# Patient Record
(demographics unavailable — no encounter records)

---

## 2017-12-19 NOTE — ERD
ER Documentation


Chief Complaint


Chief Complaint


HA on l side of head





HPI


43-year-old female sent in emergency department for complaints of a bump rash 

on the left scalp area and pain that started this morning, the patient 

describes the pain as sharp pain, 10/10 scale, as was upon touching the area.  

Patient does not have any other rash in other parts of the body.  Patient did 

not have any head injury.  Patient without any nausea or vomiting.  Patient 

does not have any blurry vision.  Patient did not take any medications to help 

with symptoms.  Patient denies any fever or chills





ROS


All systems reviewed and are negative except as per history of present illness.





Medications


Home Meds


Reported Medications


[none] Unknown Strength  No Conflict Check


   12/19/17





Allergies


Allergies:  


Coded Allergies:  


     No Known Drug Allergies (Verified  Allergy, Unknown, 12/8/14)





PMhx/Soc


Medical and Surgical Hx:  pt denies Medical Hx, pt denies Surgical Hx


Hx Alcohol Use:  No


Hx Substance Use:  No


Hx Tobacco Use:  No


Smoking Status:  Never smoker





FmHx


Family History:  No coronary disease, No diabetes, No other





Physical Exam


Vitals





Vital Signs








  Date Time  Temp Pulse Resp B/P Pulse Ox O2 Delivery O2 Flow Rate FiO2


 


12/19/17 22:57 98.3 74 16 167/93 100   








Physical Exam


GENERAL:  The patient is well developed and appropriate for usual state of 

health, in no apparent distress.


CHEST:  Clear to auscultation bilaterally. There are no rales, wheezes or 

rhonchi. 


HEART:  Regular rate and rhythm. No murmurs, clicks, rubs or gallops. No S3 or 

S4.


ABDOMEN:  Soft, nontender and nondistended. Good bowel sounds. No rebound or 

guarding. No gross peritonitis. No gross organomegaly or masses. No Slaughter sign 

or McBurney point tenderness.


BACK:  No midline or flank tenderness.


EXTREMITIES:  Equal pulses bilaterally. There is no peripheral clubbing, 

cyanosis or edema. No focal swelling or erythema. Full range of motion. Grossly 

neurovascularly intact.


NEURO:  Alert and oriented. Cranial nerves 2-12 intact. Motor strength in all 4 

extremities with 5/5 strength.  Sensation grossly intact. Normal speech and 

gait. 


SKIN: Vesicular rash noted in the left scalp area, tender on palpation.  There 

is no apparent ecchymosis or petechia. The skin is warm and dry.


HEMATOLOGIC AND LYMPHATIC:  There is no evidence of excessive bruising or 

lymphedema. No gross cervical, axillary, or inguinal lymphadenopathy.


Results 24 hrs





 Current Medications








 Medications


  (Trade)  Dose


 Ordered  Sig/Mike


 Route


 PRN Reason  Start Time


 Stop Time Status Last Admin


Dose Admin


 


 Acetaminophen/


 Hydrocodone Bitart


  (Norco (10/325))  1 tab  ONCE  ONCE


 PO


   12/20/17 00:00


 12/20/17 00:01  12/19/17 23:55


 





Patient was given medication for pain here in emergency department, after 

treatment, patient verbalized feeling much better. Patient's pain is improved.





My attending physician, Dr. Son, evaluated patient's rash, consistent with 

shingles.  Patient will be treated appropriately at this time.





Procedures/MDM


Medical decision making: Patient symptoms was likely is consistent with herpes 

zoster shingles, no symptoms of any eye involvement at this time, no symptoms 

of any ear involvement at this time.  Patient will be given acyclovir, 

gabapentin, Norco, was advised to follow-up with primary care doctor in 2 days 

for reevaluation of symptoms.  Patient was advised to return to emergency 

department for any worsening symptoms.





Disposition: Home.  Stable





Departure


Diagnosis:  


 Primary Impression:  


 Herpes zoster


 Herpes zoster complications:  without complications  Qualified Code:  B02.9 - 

Herpes zoster without complication


Condition:  Stable


Patient Instructions:  Herpes Zoster











JENNIFER JOHNSON NP Dec 19, 2017 23:53